# Patient Record
(demographics unavailable — no encounter records)

---

## 2025-05-02 NOTE — HISTORY OF PRESENT ILLNESS
[FreeTextEntry1] : Right index finger infection s/p I&D  This is a very Ples[place] 75F here today for follow up after having a right index finger infection s/p I&D in the hospital. Has DM and sings of osteo on imaging from the hospital. Did well and was discharged. Has been doing betadine soaks and home and is on 2 months of abx for her distal phalanx osteo.

## 2025-05-02 NOTE — DISCUSSION/SUMMARY
[FreeTextEntry1] : 75F with ring index finger felon and osteo s/p I&D inpatient. Doing well upon discharge. Still on abx. D/c betadine soaks and dressings. Encouraged finger ROM. F/u 4 weeks for repeat Xrays.   I have spent 35 minutes of time on the encounter which excludes teaching and/or separately reported services

## 2025-05-02 NOTE — PHYSICAL EXAM
[de-identified] : Right index finger NO signs of infection NO swelling stiffness to DIP  Cannot flex finger to palm SITLT - distal finger tip

## 2025-05-19 NOTE — ASSESSMENT
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  # Elevated inflammatory markers (CRP: 9.9)  # Type 2 DM A1c: 6.2%   MICRO:  04/22 BCX- NGTD 04/22 Abscess Cx- MSSA 04/23 MRSA nares- MSSA +   Imaging;  MRI of the right hand demonstrates soft tissue ulceration at the pointer  finger distally with underlying acute osteomyelitis of the distal phalanx  of the pointer finger. No drainable fluid collection.  Recommendations:  - Continue PO Keflex 1 gm Q8H for a total of 6 weeks from 04/22/25 until 06/02/25 - Follow I&D cultures - MSSA - Follow up with Ortho for repeat imaging    RTC 06/02/25

## 2025-05-19 NOTE — PHYSICAL EXAM
[General Appearance - Alert] : alert [Sclera] : the sclera and conjunctiva were normal [Neck Appearance] : the appearance of the neck was normal [] : no respiratory distress [Heart Rate And Rhythm] : heart rate was normal and rhythm regular [FreeTextEntry1] : R Hand: 2nd digit finger, mild erythema, swelling improved, ROM - limited  [No Focal Deficits] : no focal deficits

## 2025-05-19 NOTE — HISTORY OF PRESENT ILLNESS
[FreeTextEntry1] : 74 yo female with PMHx T2DM, HTN, asthma/COPD, NAFLD, resolved SDH, presented to ED on 04/22/25 with worsening right second finger pain that began spontaneously on 04/19 followed by swelling and redness. Patient denies trauma or injury to the finger, no exposures, recent travel, recent illnesses, recent contact with animals. She endorses getting manicures at the nail salon, but last manicure was a month ago. Patient denies fevers, chills, headache, abdominal pain, nausea, vomiting, cough, SOB, urinary or bowel changes, or any other symptoms. In the ED, patient was afebrile, labs WNL, WBC stable, except for CRP 11.7. XRAY R hand showed Well-corticated defect of the tuft of the second digit. This could be secondary to chronic injury versus adjacent destructive changes from possible infectious etiology given history of swelling and pain. Ortho hand team evaluated the patient and performed I&D on 04/22. Patient started on IV Ancef on admission. Wound cultures grew MSSA. MRI R hand demonstrates soft tissue ulceration at the pointer finger distally with underlying acute osteomyelitis of the distal phalanx of the pointer finger. No drainable fluid collection. Patient was discharged on Keflex to complete a total of 6 weeks until 06/2/25. She comes to the ID clinic today for follow up.   05/19/25: - Denies any fevers, chills, nausea, vomiting, diarrhea, no abd pain  - Right hand - 2nf digit swelling has improved, ROM has also improved as per patient  - Has an appointment with ortho at the end of this month.  - Continue Keflex 1 gm Q8H until 06/2/25- Reports has refills.

## 2025-05-30 NOTE — PHYSICAL EXAM
[de-identified] : Right index finger NO signs of infection NO swelling stiffness to DIP Can flex to palm SITLT - distal finger tip   [de-identified] :   Xray with 3 views of the right hand was done in office today, 5/30/25 , and reviewed by Dr. Ellis, demonstrated distal bone osteolysis with no further signs of osteomylitis on imaging today.

## 2025-05-30 NOTE — HISTORY OF PRESENT ILLNESS
[FreeTextEntry1] : Right index finger infection s/p I&D  HPI: This is a very Plesant 75F here today for follow up after having a right index finger infection s/p I&D in the hospital. Has DM and sings of osteo on imaging from the hospital. Did well and was discharged. Has been doing betadine soaks and home and is on 2 months of abx for her distal phalanx osteo.  Interval Hx 5/30/25: Her today for wound eval. NO pain and no signs of infection. Has been taking abx.